# Patient Record
Sex: FEMALE | Race: WHITE
[De-identification: names, ages, dates, MRNs, and addresses within clinical notes are randomized per-mention and may not be internally consistent; named-entity substitution may affect disease eponyms.]

---

## 2018-11-24 ENCOUNTER — HOSPITAL ENCOUNTER (EMERGENCY)
Dept: HOSPITAL 61 - ER | Age: 53
Discharge: HOME | End: 2018-11-24
Payer: COMMERCIAL

## 2018-11-24 VITALS — SYSTOLIC BLOOD PRESSURE: 118 MMHG | DIASTOLIC BLOOD PRESSURE: 67 MMHG

## 2018-11-24 VITALS — HEIGHT: 64 IN | BODY MASS INDEX: 33.46 KG/M2 | WEIGHT: 196 LBS

## 2018-11-24 DIAGNOSIS — R07.89: Primary | ICD-10-CM

## 2018-11-24 DIAGNOSIS — I10: ICD-10-CM

## 2018-11-24 DIAGNOSIS — Z88.8: ICD-10-CM

## 2018-11-24 DIAGNOSIS — F41.9: ICD-10-CM

## 2018-11-24 DIAGNOSIS — Z90.710: ICD-10-CM

## 2018-11-24 LAB
ALBUMIN SERPL-MCNC: 4.2 G/DL (ref 3.4–5)
ALBUMIN/GLOB SERPL: 1.4 {RATIO} (ref 1–1.7)
ALP SERPL-CCNC: 119 U/L (ref 46–116)
ALT SERPL-CCNC: 35 U/L (ref 14–59)
ANION GAP SERPL CALC-SCNC: 12 MMOL/L (ref 6–14)
AST SERPL-CCNC: 19 U/L (ref 15–37)
BASOPHILS # BLD AUTO: 0 X10^3/UL (ref 0–0.2)
BASOPHILS NFR BLD: 0 % (ref 0–3)
BILIRUB SERPL-MCNC: 0.4 MG/DL (ref 0.2–1)
BUN SERPL-MCNC: 17 MG/DL (ref 7–20)
BUN/CREAT SERPL: 28 (ref 6–20)
CALCIUM SERPL-MCNC: 9.9 MG/DL (ref 8.5–10.1)
CHLORIDE SERPL-SCNC: 102 MMOL/L (ref 98–107)
CO2 SERPL-SCNC: 27 MMOL/L (ref 21–32)
CREAT SERPL-MCNC: 0.6 MG/DL (ref 0.6–1)
D DIMER PPP FEU-MCNC: 2.41 UG/MLFEU (ref 0–0.5)
EOSINOPHIL NFR BLD: 0.1 X10^3/UL (ref 0–0.7)
EOSINOPHIL NFR BLD: 1 % (ref 0–3)
ERYTHROCYTE [DISTWIDTH] IN BLOOD BY AUTOMATED COUNT: 13.5 % (ref 11.5–14.5)
GFR SERPLBLD BASED ON 1.73 SQ M-ARVRAT: 105 ML/MIN
GLOBULIN SER-MCNC: 2.9 G/DL (ref 2.2–3.8)
GLUCOSE SERPL-MCNC: 84 MG/DL (ref 70–99)
HCT VFR BLD CALC: 42.1 % (ref 36–47)
HGB BLD-MCNC: 14.5 G/DL (ref 12–15.5)
LIPASE: 144 U/L (ref 73–393)
LYMPHOCYTES # BLD: 2.4 X10^3/UL (ref 1–4.8)
LYMPHOCYTES NFR BLD AUTO: 36 % (ref 24–48)
MAGNESIUM SERPL-MCNC: 2.1 MG/DL (ref 1.8–2.4)
MCH RBC QN AUTO: 31 PG (ref 25–35)
MCHC RBC AUTO-ENTMCNC: 35 G/DL (ref 31–37)
MCV RBC AUTO: 90 FL (ref 79–100)
MONO #: 0.4 X10^3/UL (ref 0–1.1)
MONOCYTES NFR BLD: 6 % (ref 0–9)
NEUT #: 3.8 X10^3UL (ref 1.8–7.7)
NEUTROPHILS NFR BLD AUTO: 57 % (ref 31–73)
PLATELET # BLD AUTO: 279 X10^3/UL (ref 140–400)
POTASSIUM SERPL-SCNC: 3.9 MMOL/L (ref 3.5–5.1)
PROT SERPL-MCNC: 7.1 G/DL (ref 6.4–8.2)
PROTHROMBIN TIME: 11.5 SEC (ref 11.7–14)
RBC # BLD AUTO: 4.67 X10^6/UL (ref 3.5–5.4)
SODIUM SERPL-SCNC: 141 MMOL/L (ref 136–145)
WBC # BLD AUTO: 6.7 X10^3/UL (ref 4–11)

## 2018-11-24 PROCEDURE — 96374 THER/PROPH/DIAG INJ IV PUSH: CPT

## 2018-11-24 PROCEDURE — 36415 COLL VENOUS BLD VENIPUNCTURE: CPT

## 2018-11-24 PROCEDURE — 80053 COMPREHEN METABOLIC PANEL: CPT

## 2018-11-24 PROCEDURE — 84484 ASSAY OF TROPONIN QUANT: CPT

## 2018-11-24 PROCEDURE — 85379 FIBRIN DEGRADATION QUANT: CPT

## 2018-11-24 PROCEDURE — 93005 ELECTROCARDIOGRAM TRACING: CPT

## 2018-11-24 PROCEDURE — 83690 ASSAY OF LIPASE: CPT

## 2018-11-24 PROCEDURE — 85025 COMPLETE CBC W/AUTO DIFF WBC: CPT

## 2018-11-24 PROCEDURE — 83735 ASSAY OF MAGNESIUM: CPT

## 2018-11-24 PROCEDURE — 71045 X-RAY EXAM CHEST 1 VIEW: CPT

## 2018-11-24 PROCEDURE — 99284 EMERGENCY DEPT VISIT MOD MDM: CPT

## 2018-11-24 PROCEDURE — 71275 CT ANGIOGRAPHY CHEST: CPT

## 2018-11-24 PROCEDURE — 85610 PROTHROMBIN TIME: CPT

## 2018-11-24 PROCEDURE — 83880 ASSAY OF NATRIURETIC PEPTIDE: CPT

## 2018-11-24 NOTE — RAD
Single view chest 11/24/2018

 

CLINICAL INDICATION: Left-sided chest pain.

 

COMPARISON: None.

 

FINDINGS: Cardiac and mediastinal silhouettes are unremarkable. Mild 

elevation of the right hemidiaphragm. No pleural effusion, pneumothorax or

focal consolidation.

 

IMPRESSION: No acute cardiopulmonary abnormality.

 

Electronically signed by: Jose Morejon MD (11/24/2018 1:02 PM) [facility]

## 2018-11-24 NOTE — EKG
[facility]

              [address] [place] [address]

Test Date:    2018               Test Time:    11:41:01

Pat Name:     NATALIO CHANG        Department:   

Patient ID:   [id number]           Room:          

Gender:       F                        Technician:   

:          1965               Requested By: CHRISTOS COLON

Order Number: [id number]           Reading MD:   David Reza MD

                                 Measurements

Intervals                              Axis          

Rate:         72                       P:            43

IA:           162                      QRS:          10

QRSD:         86                       T:            25

QT:           426                                    

QTc:          468                                    

                           Interpretive Statements

SINUS RHYTHM



Electronically Signed On 2018 8:36:14 CST by David Reza MD

## 2018-11-24 NOTE — PHYS DOC
Past Medical History


Past Medical History:  Anxiety, Hypertension


Past Surgical History:  Hysterectomy


Additional Information:  


Nonsmoker


Alcohol Use:  Occasionally


Drug Use:  None





Adult General


Chief Complaint


Chief Complaint:  CHEST PAIN





HPI


HPI





Patient is a 53  year old female who presents with left-sided chest pain that 

was sharp, respiratory phase a can nature. Began at approximately 10:00 this 

morning. No trauma. No shortness of breath. No real change with body position, 

standing, sitting, or laying. Maybe a little bit worse with movement. Patient 

does have a history of a nodule in her right chest which is being followed by 

pulmonology. Last visit with pulmonology was 3 days ago. No cough. No fever. No 

radiation of the discomfort. No lower extremity swelling. No home medication 

taken for the discomfort. []





Review of Systems


Review of Systems





Constitutional: Denies fever or chills []


Eyes: Denies change in visual acuity, redness, or eye pain []


HENT: Denies nasal congestion or sore throat []


Respiratory: Denies cough or shortness of breath []


Cardiovascular: No additional information not addressed in HPI []


GI: Denies abdominal pain, nausea, vomiting, bloody stools or diarrhea []


: Denies dysuria or hematuria []


Musculoskeletal: Denies back pain or joint pain []


Integument: Denies rash or skin lesions []


Neurologic: Denies headache, focal weakness or sensory changes []


Endocrine: Denies polyuria or polydipsia []





All other systems were reviewed and found to be within normal limits, except as 

documented in this note.





Current Medications


Current Medications





Current Medications








 Medications


  (Trade)  Dose


 Ordered  Sig/[facility]  Start Time


 Stop Time Status Last Admin


Dose Admin


 


 Aspirin


  ([facility]


 Aspirin)  324 mg  1X  ONCE  11/24/18 12:30


 11/24/18 12:42 DC 11/24/18 12:30


324 MG


 


 Info


  (CONTRAST GIVEN


 -- Rx MONITORING)  1 each  PRN DAILY  PRN  11/24/18 15:45


 11/26/18 15:44   





 


 Iohexol


  (Omnipaque 300


 Mg/ml)  90 ml  1X  ONCE  11/24/18 15:45


 11/24/18 15:46 DC  





 


 Ketorolac


 Tromethamine


  (Toradol 15mg


 Vial)  15 mg  1X  ONCE  11/24/18 13:45


 11/24/18 13:46 DC 11/24/18 13:59


15 MG











Allergies


Allergies





Allergies








Coded Allergies Type Severity Reaction Last Updated Verified


 


  lisinopril Allergy Unknown  11/24/18 Yes











Physical Exam


Physical Exam





Constitutional: Well developed, well nourished, no acute distress, non-toxic 

appearance. []


HENT: Normocephalic, atraumatic, bilateral external ears normal, oropharynx 

moist, no oral exudates, nose normal. []


Eyes: PERRLA, EOMI, conjunctiva normal, no discharge. [] 


Neck: Normal range of motion, no tenderness, supple, no stridor. [] 


Cardiovascular:Heart rate regular rhythm, no murmur []


Lungs & Thorax:  Bilateral breath sounds clear to auscultation. No chest 

tenderness to palpation in the location of the discomfort []


Abdomen: Bowel sounds normal, soft, no tenderness, no masses, no pulsatile 

masses. [] 


Skin: Warm, dry, no erythema, no rash. [] 


Back: No tenderness, no CVA tenderness. [] 


Extremities: No tenderness, no cyanosis, no clubbing, ROM intact, no edema. [] 


Neurologic: Alert and oriented X 3, normal motor function, normal sensory 

function, no focal deficits noted. []


Psychologic: Affect normal, judgement normal, mood normal. []





Current Patient Data


Vital Signs





 Vital Signs








  Date Time  Temp Pulse Resp B/P (MAP) Pulse Ox O2 Delivery O2 Flow Rate FiO2


 


11/24/18 11:45 98.6 65 16 167/83 (111) 99 Room Air  





 98.6       








Lab Values





 Laboratory Tests








Test


 11/24/18


12:50


 


White Blood Count


 6.7 x10^3/uL


(4.0-11.0)


 


Red Blood Count


 4.67 x10^6/uL


(3.50-5.40)


 


Hemoglobin


 14.5 g/dL


(12.0-15.5)


 


Hematocrit


 42.1 %


(36.0-47.0)


 


Mean Corpuscular Volume


 90 fL ()





 


Mean Corpuscular Hemoglobin 31 pg (25-35)  


 


Mean Corpuscular Hemoglobin


Concent 35 g/dL


(31-37)


 


Red Cell Distribution Width


 13.5 %


(11.5-14.5)


 


Platelet Count


 279 x10^3/uL


(140-400)


 


Neutrophils (%) (Auto) 57 % (31-73)  


 


Lymphocytes (%) (Auto) 36 % (24-48)  


 


Monocytes (%) (Auto) 6 % (0-9)  


 


Eosinophils (%) (Auto) 1 % (0-3)  


 


Basophils (%) (Auto) 0 % (0-3)  


 


Neutrophils # (Auto)


 3.8 x10^3uL


(1.8-7.7)


 


Lymphocytes # (Auto)


 2.4 x10^3/uL


(1.0-4.8)


 


Monocytes # (Auto)


 0.4 x10^3/uL


(0.0-1.1)


 


Eosinophils # (Auto)


 0.1 x10^3/uL


(0.0-0.7)


 


Basophils # (Auto)


 0.0 x10^3/uL


(0.0-0.2)


 


Prothrombin Time


 11.5 SEC


(11.7-14.0)  L


 


Prothrombin Time INR 0.9 (0.8-1.1)  


 


D-Dimer (Yulisa)


 2.41 ug/mlFEU


(0.00-0.50)  H


 


Sodium Level


 141 mmol/L


(136-145)


 


Potassium Level


 3.9 mmol/L


(3.5-5.1)


 


Chloride Level


 102 mmol/L


()


 


Carbon Dioxide Level


 27 mmol/L


(21-32)


 


Anion Gap 12 (6-14)  


 


Blood Urea Nitrogen


 17 mg/dL


(7-20)


 


Creatinine


 0.6 mg/dL


(0.6-1.0)


 


Estimated GFR


(Cockcroft-Gault) 105.0  





 


BUN/Creatinine Ratio 28 (6-20)  H


 


Glucose Level


 84 mg/dL


(70-99)


 


Calcium Level


 9.9 mg/dL


(8.5-10.1)


 


Magnesium Level


 2.1 mg/dL


(1.8-2.4)


 


Total Bilirubin


 0.4 mg/dL


(0.2-1.0)


 


Aspartate Amino Transferase


(AST) 19 U/L (15-37)





 


Alanine Aminotransferase (ALT)


 35 U/L (14-59)





 


Alkaline Phosphatase


 119 U/L


()  H


 


Troponin I Quantitative


 < 0.017 ng/mL


(0.000-0.055)


 


NT-Pro-B-Type Natriuretic


Peptide 108 pg/mL


(0-124)


 


Total Protein


 7.1 g/dL


(6.4-8.2)


 


Albumin


 4.2 g/dL


(3.4-5.0)


 


Albumin/Globulin Ratio 1.4 (1.0-1.7)  


 


Lipase


 144 U/L


()





 Laboratory Tests


11/24/18 12:50








 Laboratory Tests


11/24/18 12:50











EKG


EKG


EKG shows a normal sinus rhythm, no ST elevations, heart rate of 72, normal axis

, a QTC of 468 ms[]





Radiology/Procedures


Radiology/Procedures


Chest x-ray did not show any acute features





CTA of the chest shows no evidence of a pulmonary embolism or alternative acute 

thoracic findings[]





Course & Med Decision Making


Course & Med Decision Making


Pertinent Labs and Imaging studies reviewed. (See chart for details)





ED course: Patient arrived, was placed in bed, tolerated exam well. Patient was 

transported to and from CT with a complication of inadequate bolus. So a new IV 

was established and patient was transported to and from CT a second time with 

no complications from the second CTA of the chest. Patient did achieve some 

pain relief with the medications administered. After the return of the lab and 

imaging findings, these were discussed with the patient and her family. All 

questions were answered.





Medical decision making: There is no evidence of acute coronary syndrome, no 

pneumonia, no pneumothorax, patient had an elevated d-dimer but had a negative 

CTA of the chest. No evidence of esophageal rupture.[]





Dragon Disclaimer


Dragon Disclaimer


This electronic medical record was generated, in whole or in part, using a 

voice recognition dictation system.





Departure


Departure


Impression:  


 Primary Impression:  


 Chest pain


Disposition:  01 HOME, SELF-CARE


Condition:  GOOD


Patient Instructions:  Chest Pain (Nonspecific)





Additional Instructions:  


Follow-up with your regular doctor. Return to the ER if worsening pain, 

difficulty breathing, or any other concerns.


Scripts


Tramadol Hcl (TRAMADOL HCL) 50 Mg Tablet


50 MG PO Q6HRS PRN for PAIN, #20 TAB


   Prov: CHRISTOS COLON DO         11/24/18 


Meloxicam (MELOXICAM) 7.5 Mg Tablet


7.5 MG PO DAILY, #20 TAB


   Prov: CHRISTOS COLON DO         11/24/18





Problem Qualifiers








 Primary Impression:  


 Chest pain


 Chest pain type:  unspecified  Qualified Codes:  R07.9 - Chest pain, 

unspecified








CHRISTOS COLON DO Nov 24, 2018 12:30

## 2018-11-24 NOTE — RAD
EXAM: CT angiography of the chest with intravenous contrast.

 

HISTORY: Shortness of breath. Chest pain.

 

TECHNIQUE: Computed tomographic images of the chest were obtained 

following the administration of 90 cc Omnipaque 300 intravenous contrast 

according to angiography protocol. Multiplanar reformatting was performed 

and 3-dimensional maximum intensity projection images were obtained. 

*One or more of the following individualized dose reduction techniques 

were utilized for this examination:  

1. Automated exposure control.  

2. Adjustment of the mA and/or kV according to patient size.  

3. Use of iterative reconstruction technique.

 

COMPARISON: None.

 

FINDINGS: There is no convincing pulmonary embolism. The heart is normal 

in size. The aorta is normal in caliber. There are few nonspecific 

mediastinal and hilar lymph nodes, likely physiologic or reactive in 

etiology. There is no pneumothorax or pleural effusion. There is minimal 

posterior dependent atelectasis. There is no infiltrate.

 

There is focal opacity along the inferior medial right major fissure 

likely due to pleural parenchymal scarring or a prominent fissural lymph 

node. No suspicious nodule is seen. There is a prominent right renal 

collecting system, predominant excluded from the field-of-view.

 

IMPRESSION: No evidence of pulmonary embolism or alternative acute 

thoracic finding.

 

Electronically signed by: Theresa Fuentes MD (11/24/2018 4:12 PM) [facility]

## 2020-07-27 ENCOUNTER — HOSPITAL ENCOUNTER (OUTPATIENT)
Dept: HOSPITAL 35 - SJCVCIMAG | Age: 55
End: 2020-07-27
Attending: INTERNAL MEDICINE
Payer: COMMERCIAL

## 2020-07-27 DIAGNOSIS — I10: ICD-10-CM

## 2020-07-27 DIAGNOSIS — R55: ICD-10-CM

## 2020-07-27 DIAGNOSIS — I07.1: Primary | ICD-10-CM
